# Patient Record
Sex: MALE | Race: BLACK OR AFRICAN AMERICAN | HISPANIC OR LATINO | Employment: FULL TIME | URBAN - METROPOLITAN AREA
[De-identification: names, ages, dates, MRNs, and addresses within clinical notes are randomized per-mention and may not be internally consistent; named-entity substitution may affect disease eponyms.]

---

## 2018-03-16 ENCOUNTER — OFFICE VISIT (OUTPATIENT)
Dept: FAMILY MEDICINE CLINIC | Facility: CLINIC | Age: 33
End: 2018-03-16
Payer: COMMERCIAL

## 2018-03-16 VITALS
HEART RATE: 86 BPM | TEMPERATURE: 96.5 F | DIASTOLIC BLOOD PRESSURE: 74 MMHG | OXYGEN SATURATION: 96 % | RESPIRATION RATE: 20 BRPM | WEIGHT: 256 LBS | BODY MASS INDEX: 37.92 KG/M2 | SYSTOLIC BLOOD PRESSURE: 126 MMHG | HEIGHT: 69 IN

## 2018-03-16 DIAGNOSIS — K64.8 OTHER HEMORRHOIDS: ICD-10-CM

## 2018-03-16 DIAGNOSIS — K59.09 OTHER CONSTIPATION: ICD-10-CM

## 2018-03-16 DIAGNOSIS — K21.00 GASTROESOPHAGEAL REFLUX DISEASE WITH ESOPHAGITIS: Primary | ICD-10-CM

## 2018-03-16 PROCEDURE — 99203 OFFICE O/P NEW LOW 30 MIN: CPT | Performed by: PHYSICIAN ASSISTANT

## 2018-03-16 RX ORDER — PANTOPRAZOLE SODIUM 20 MG/1
20 TABLET, DELAYED RELEASE ORAL DAILY
Qty: 90 TABLET | Refills: 1 | Status: SHIPPED | OUTPATIENT
Start: 2018-03-16 | End: 2021-03-26 | Stop reason: SDUPTHER

## 2018-03-16 NOTE — ASSESSMENT & PLAN NOTE
Discussed with patient that he should continue take Metamucil on a daily basis even if bowel movements are normal   If he starts to experience any diarrhea that is when I would continue to cut back on the Metamucil  Gave patient a list of foods that can improve constipation symptoms, or make them worse

## 2018-03-16 NOTE — ASSESSMENT & PLAN NOTE
With concerns of hemorrhoids and rectal pain with bowel movements will refer to colorectal surgery for further evaluation

## 2018-03-16 NOTE — PROGRESS NOTES
Assessment/Plan:    Gastroesophageal reflux disease with esophagitis  Believe patient's symptoms are due to acid reflux  Will start on pantoprazole to see if this will help with symptoms  Will have patient follow up in 1 month to see if the symptoms improved  If there is no improvement will order a swallow study  Other constipation  Discussed with patient that he should continue take Metamucil on a daily basis even if bowel movements are normal   If he starts to experience any diarrhea that is when I would continue to cut back on the Metamucil  Gave patient a list of foods that can improve constipation symptoms, or make them worse  Other hemorrhoids  With concerns of hemorrhoids and rectal pain with bowel movements will refer to colorectal surgery for further evaluation  Diagnoses and all orders for this visit:    Gastroesophageal reflux disease with esophagitis  -     pantoprazole (PROTONIX) 20 mg tablet; Take 1 tablet (20 mg total) by mouth daily  -     CBC and differential; Future  -     Comprehensive metabolic panel; Future  -     Lipid panel; Future  -     Urinalysis with reflex to microscopic; Future    Other constipation  -     Ambulatory referral to Colorectal Surgery; Future    Other hemorrhoids  -     Ambulatory referral to Colorectal Surgery; Future          Subjective:      Patient ID: Adri Julian is a 29 y o  male  78-year-old male presenting with concerns of food getting stuck when he swallows x 1 year  States that when he swallows solids it feels like it is getting stuck on the way down  At times this will causing the cough and he may bring food back up  Does describe a sharp pain in the epigastric area  Denies any burning sensation in his chest, or symptoms waking him up at night  Has noticed certain foods, such as spicy foods, it due make his symptoms worse  Patient has also been experiencing constipation for the last year    States that he does have bowel movements on a daily basis, however he has to strain with bowel movements  When he does heavy lifting he will get the urge to have a bowel movement, when he tries he will be unsuccessful  Does note that he will have blood on the surface of the stool 1 to 2 times a week  He will also have rectal pain with bowel movements  States that he drinks a lot of water and ice tea, he used to drink a lot of milk but has been cutting back  States for breakfast he typically will have a breakfast sandwich  For lunch and dinner he typically has rice, beans, and meat  Eats limited amount of fruits and vegetables during the day  Has been taking Metamucil, which has been helping with bowel movements  The following portions of the patient's history were reviewed and updated as appropriate:   He  has no past medical history on file  He   Patient Active Problem List    Diagnosis Date Noted    Gastroesophageal reflux disease with esophagitis 03/16/2018    Other constipation 03/16/2018    Other hemorrhoids 03/16/2018     His family history includes Asthma in his mother; Diabetes in his cousin and father  He  reports that he has never smoked  He has never used smokeless tobacco  He reports that he does not drink alcohol or use drugs  Current Outpatient Prescriptions   Medication Sig Dispense Refill    pantoprazole (PROTONIX) 20 mg tablet Take 1 tablet (20 mg total) by mouth daily 90 tablet 1     No current facility-administered medications for this visit  He has No Known Allergies       Review of Systems   Constitutional: Negative for activity change, appetite change, chills, fatigue and fever  HENT: Positive for trouble swallowing  Negative for sore throat  Respiratory: Negative for cough, chest tightness and shortness of breath  Cardiovascular: Negative for chest pain, palpitations and leg swelling  Gastrointestinal: Positive for abdominal pain, blood in stool, constipation and nausea   Negative for diarrhea and vomiting  Endocrine: Negative for polydipsia, polyphagia and polyuria  Genitourinary: Negative for dysuria  Musculoskeletal: Negative for back pain  Neurological: Negative for dizziness, syncope, weakness and headaches  Objective:      /74   Pulse 86   Temp (!) 96 5 °F (35 8 °C)   Resp 20   Ht 5' 8 5" (1 74 m)   Wt 116 kg (256 lb)   SpO2 96%   BMI 38 36 kg/m²          Physical Exam   Constitutional: He is oriented to person, place, and time  He appears well-developed and well-nourished  No distress  HENT:   Right Ear: Tympanic membrane, external ear and ear canal normal    Left Ear: Tympanic membrane, external ear and ear canal normal    Nose: Nose normal    Mouth/Throat: Oropharynx is clear and moist and mucous membranes are normal  No oropharyngeal exudate  Eyes: Conjunctivae and EOM are normal  Pupils are equal, round, and reactive to light  Neck: Normal range of motion  Neck supple  Cardiovascular: Normal rate, regular rhythm, normal heart sounds and normal pulses  Exam reveals no gallop and no friction rub  No murmur heard  Pulmonary/Chest: Effort normal and breath sounds normal  No respiratory distress  He has no wheezes  He has no rales  Abdominal: Soft  Bowel sounds are normal  He exhibits no distension and no mass  There is no tenderness  There is no rebound and no guarding  Musculoskeletal: Normal range of motion  He exhibits no edema  Lymphadenopathy:     He has no cervical adenopathy  Neurological: He is alert and oriented to person, place, and time  He has normal strength and normal reflexes  No cranial nerve deficit  Skin: Skin is warm and dry  No rash noted  He is not diaphoretic  Psychiatric: He has a normal mood and affect  His behavior is normal    Nursing note and vitals reviewed

## 2018-03-16 NOTE — ASSESSMENT & PLAN NOTE
Believe patient's symptoms are due to acid reflux  Will start on pantoprazole to see if this will help with symptoms  Will have patient follow up in 1 month to see if the symptoms improved  If there is no improvement will order a swallow study

## 2018-03-16 NOTE — PATIENT INSTRUCTIONS
Constipation   WHAT YOU NEED TO KNOW:   Constipation is when you have hard, dry bowel movements, or you go longer than usual between bowel movements  DISCHARGE INSTRUCTIONS:   Return to the emergency department if:   · You have blood in your bowel movements  · You have a fever and abdominal pain with the constipation  Contact your healthcare provider if:   · Your constipation gets worse  · You start to vomit  · You have questions or concerns about your condition or care  Medicines:   · Medicine or a fiber supplement  may help make your bowel movement softer  A laxative may help relax and loosen your intestines to help you have a bowel movement  You may also be given medicine to increase fluid in your intestines  The fluid may help move bowel movements through your intestines  · Take your medicine as directed  Contact your healthcare provider if you think your medicine is not helping or if you have side effects  Tell him of her if you are allergic to any medicine  Keep a list of the medicines, vitamins, and herbs you take  Include the amounts, and when and why you take them  Bring the list or the pill bottles to follow-up visits  Carry your medicine list with you in case of an emergency  Manage your constipation:   · Drink liquids as directed  You may need to drink extra liquids to help soften and move your bowels  Ask how much liquid to drink each day and which liquids are best for you  · Eat high-fiber foods  This may help decrease constipation by adding bulk to your bowel movements  High-fiber foods include fruit, vegetables, whole-grain breads and cereals, and beans  Your healthcare provider or dietitian can help you create a high-fiber meal plan  · Exercise regularly  Regular physical activity can help stimulate your intestines  Ask which exercises are best for you  · Schedule a time each day to have a bowel movement    This may help train your body to have regular bowel movements  Bend forward while you are on the toilet to help move the bowel movement out  Sit on the toilet for at least 10 minutes, even if you do not have a bowel movement  Follow up with your healthcare provider as directed:  Write down your questions so you remember to ask them during your visits  © 2017 [address] [employer] Information is for End User's use only and may not be sold, redistributed or otherwise used for commercial purposes  All illustrations and images included in CareNotes® are the copyrighted property of [employer]  or [employer]ine Dixon  The above information is an  only  It is not intended as medical advice for individual conditions or treatments  Talk to your doctor, nurse or pharmacist before following any medical regimen to see if it is safe and effective for you

## 2018-03-26 ENCOUNTER — TRANSCRIBE ORDERS (OUTPATIENT)
Dept: LAB | Facility: CLINIC | Age: 33
End: 2018-03-26

## 2018-03-26 ENCOUNTER — APPOINTMENT (OUTPATIENT)
Dept: LAB | Facility: CLINIC | Age: 33
End: 2018-03-26
Payer: COMMERCIAL

## 2018-03-26 DIAGNOSIS — K21.00 GASTROESOPHAGEAL REFLUX DISEASE WITH ESOPHAGITIS: ICD-10-CM

## 2018-03-26 LAB
ALBUMIN SERPL BCP-MCNC: 3.9 G/DL (ref 3.5–5)
ALP SERPL-CCNC: 76 U/L (ref 46–116)
ALT SERPL W P-5'-P-CCNC: 28 U/L (ref 12–78)
ANION GAP SERPL CALCULATED.3IONS-SCNC: 5 MMOL/L (ref 4–13)
AST SERPL W P-5'-P-CCNC: 21 U/L (ref 5–45)
BASOPHILS # BLD AUTO: 0.05 THOUSANDS/ΜL (ref 0–0.1)
BASOPHILS NFR BLD AUTO: 1 % (ref 0–1)
BILIRUB SERPL-MCNC: 1.34 MG/DL (ref 0.2–1)
BILIRUB UR QL STRIP: NEGATIVE
BUN SERPL-MCNC: 14 MG/DL (ref 5–25)
CALCIUM SERPL-MCNC: 8.9 MG/DL (ref 8.3–10.1)
CHLORIDE SERPL-SCNC: 105 MMOL/L (ref 100–108)
CHOLEST SERPL-MCNC: 200 MG/DL (ref 50–200)
CLARITY UR: ABNORMAL
CO2 SERPL-SCNC: 28 MMOL/L (ref 21–32)
COLOR UR: ABNORMAL
CREAT SERPL-MCNC: 0.91 MG/DL (ref 0.6–1.3)
EOSINOPHIL # BLD AUTO: 0.16 THOUSAND/ΜL (ref 0–0.61)
EOSINOPHIL NFR BLD AUTO: 2 % (ref 0–6)
ERYTHROCYTE [DISTWIDTH] IN BLOOD BY AUTOMATED COUNT: 12.9 % (ref 11.6–15.1)
GFR SERPL CREATININE-BSD FRML MDRD: 111 ML/MIN/1.73SQ M
GLUCOSE P FAST SERPL-MCNC: 98 MG/DL (ref 65–99)
GLUCOSE UR STRIP-MCNC: NEGATIVE MG/DL
HCT VFR BLD AUTO: 43 % (ref 36.5–49.3)
HDLC SERPL-MCNC: 42 MG/DL (ref 40–60)
HGB BLD-MCNC: 15 G/DL (ref 12–17)
HGB UR QL STRIP.AUTO: NEGATIVE
KETONES UR STRIP-MCNC: NEGATIVE MG/DL
LDLC SERPL CALC-MCNC: 111 MG/DL (ref 0–100)
LEUKOCYTE ESTERASE UR QL STRIP: NEGATIVE
LYMPHOCYTES # BLD AUTO: 2.16 THOUSANDS/ΜL (ref 0.6–4.47)
LYMPHOCYTES NFR BLD AUTO: 32 % (ref 14–44)
MCH RBC QN AUTO: 29.1 PG (ref 26.8–34.3)
MCHC RBC AUTO-ENTMCNC: 34.9 G/DL (ref 31.4–37.4)
MCV RBC AUTO: 84 FL (ref 82–98)
MONOCYTES # BLD AUTO: 0.49 THOUSAND/ΜL (ref 0.17–1.22)
MONOCYTES NFR BLD AUTO: 7 % (ref 4–12)
NEUTROPHILS # BLD AUTO: 3.93 THOUSANDS/ΜL (ref 1.85–7.62)
NEUTS SEG NFR BLD AUTO: 58 % (ref 43–75)
NITRITE UR QL STRIP: NEGATIVE
NRBC BLD AUTO-RTO: 0 /100 WBCS
PH UR STRIP.AUTO: 6 [PH] (ref 4.5–8)
PLATELET # BLD AUTO: 231 THOUSANDS/UL (ref 149–390)
PMV BLD AUTO: 9.4 FL (ref 8.9–12.7)
POTASSIUM SERPL-SCNC: 4 MMOL/L (ref 3.5–5.3)
PROT SERPL-MCNC: 7.8 G/DL (ref 6.4–8.2)
PROT UR STRIP-MCNC: NEGATIVE MG/DL
RBC # BLD AUTO: 5.15 MILLION/UL (ref 3.88–5.62)
SODIUM SERPL-SCNC: 138 MMOL/L (ref 136–145)
SP GR UR STRIP.AUTO: 1.03 (ref 1–1.03)
TRIGL SERPL-MCNC: 234 MG/DL
UROBILINOGEN UR QL STRIP.AUTO: 0.2 E.U./DL
WBC # BLD AUTO: 6.88 THOUSAND/UL (ref 4.31–10.16)

## 2018-03-26 PROCEDURE — 80061 LIPID PANEL: CPT

## 2018-03-26 PROCEDURE — 81003 URINALYSIS AUTO W/O SCOPE: CPT

## 2018-03-26 PROCEDURE — 80053 COMPREHEN METABOLIC PANEL: CPT

## 2018-03-26 PROCEDURE — 85025 COMPLETE CBC W/AUTO DIFF WBC: CPT

## 2018-03-26 PROCEDURE — 36415 COLL VENOUS BLD VENIPUNCTURE: CPT

## 2018-04-20 ENCOUNTER — OFFICE VISIT (OUTPATIENT)
Dept: FAMILY MEDICINE CLINIC | Facility: CLINIC | Age: 33
End: 2018-04-20
Payer: COMMERCIAL

## 2018-04-20 VITALS
TEMPERATURE: 97.9 F | SYSTOLIC BLOOD PRESSURE: 120 MMHG | RESPIRATION RATE: 18 BRPM | DIASTOLIC BLOOD PRESSURE: 72 MMHG | HEART RATE: 118 BPM | BODY MASS INDEX: 38.28 KG/M2 | HEIGHT: 68 IN | OXYGEN SATURATION: 97 % | WEIGHT: 252.56 LBS

## 2018-04-20 DIAGNOSIS — M25.561 LATERAL KNEE PAIN, RIGHT: Primary | ICD-10-CM

## 2018-04-20 PROCEDURE — 1036F TOBACCO NON-USER: CPT | Performed by: PHYSICIAN ASSISTANT

## 2018-04-20 PROCEDURE — 3008F BODY MASS INDEX DOCD: CPT | Performed by: PHYSICIAN ASSISTANT

## 2018-04-20 PROCEDURE — 99213 OFFICE O/P EST LOW 20 MIN: CPT | Performed by: PHYSICIAN ASSISTANT

## 2018-04-20 RX ORDER — BACLOFEN 10 MG/1
10 TABLET ORAL 3 TIMES DAILY PRN
Qty: 30 TABLET | Refills: 0 | Status: SHIPPED | OUTPATIENT
Start: 2018-04-20

## 2018-04-20 RX ORDER — NAPROXEN 500 MG/1
500 TABLET ORAL 2 TIMES DAILY WITH MEALS
Qty: 60 TABLET | Refills: 0 | Status: SHIPPED | OUTPATIENT
Start: 2018-04-20

## 2018-04-20 NOTE — PATIENT INSTRUCTIONS
Iliotibial Band Syndrome   AMBULATORY CARE:   Iliotibial band syndrome (ITBS)  also known as runner's knee, happens when your iliotibial band becomes injured and causes pain  The iliotibial band is a long band of tissue that extends from the outside of your pelvis (hip bone) to the outside of your tibia (shin bone)  It helps keeps the knee in the correct position when you stand or move  ITBS occurs most often in long distance runners and cyclists  Common symptoms include the following:   · Pain or swelling in your hip, knee, thigh, or buttock    · Pain when you touch your hip, outer thigh, or outer part of your knee    · Pain that gets worse during running or repetitive exercises and gets better when you rest  Contact your healthcare provider if:   · You have a fever or chills  · You have redness, warmth, or severe swelling  · Your pain does not improve or gets worse after treatments  · You have questions or concerns about your condition or care  Treatment for ITBS  may include rest and physical therapy  Surgery may be needed if other treatments do not work  Medicines:  may include any  of the following:  · NSAIDs , such as ibuprofen, help decrease swelling, pain, and fever  This medicine is available with or without a doctor's order  NSAIDs can cause stomach bleeding or kidney problems in certain people  If you take blood thinner medicine, always ask your healthcare provider if NSAIDs are safe for you  Always read the medicine label and follow directions  · Medicine to decrease pain and swelling  can be applied to your skin over sore areas as directed  · Prescription pain medicine  may be given  Ask your healthcare provider how to take this medicine safely  · Steroid injections  may be given to decrease swelling  · Take your medicine as directed  Contact your healthcare provider if you think your medicine is not helping or if you have side effects   Tell him or her if you are allergic to any medicine  Keep a list of the medicines, vitamins, and herbs you take  Include the amounts, and when and why you take them  Bring the list or the pill bottles to follow-up visits  Carry your medicine list with you in case of an emergency  Manage your symptoms:   · Rest   Do not do exercises that cause pain  You may need to rest for several weeks  Ask your healthcare provider when you can resume your normal exercises  · Apply ice  on your hip, knee or thigh for 15 to 20 minutes every hour or as directed  Use an ice pack, or put crushed ice in a plastic bag  Cover it with a towel  Ice helps prevent tissue damage and decreases swelling and pain  · Apply heat  on the area in pain for 20 to 30 minutes before your stretch or exercise  Heat helps decrease pain and makes it easier to stretch your muscles  · Massage painful areas as directed  Use a foam roller to gently massage your painful areas  Place the foam roller on a flat surface  Lie on your side with the foam roller against your painful leg  Move so that it rolls up and down from your hip to above your knee  Do not lie with it against the outside of your knee cap  · Stretch as directed  Stand and wrap your painful leg behind and to the side of your other leg  Reach your arm to the outside of the ankle of your painful leg  Hold for 15 seconds Return to the starting position  Do this 10 times  Repeat this exercise 3 to 5 times per day  Ask your healthcare provider or physical therapist for other ways to stretch  · Go to physical therapy as directed  Your physical therapist will teach you how to do exercises that will strengthen your hip muscles and improve your flexibility  Prevent iliotibial band syndrome:   · Slowly increase the time and distance that you run or bike  Start with short runs or cycle rides  Ask your healthcare provider how and when to increase your distance and time that you exercise       · Do not run down hills or stairs  When your healthcare provider says it is okay to exercise, run on flat surfaces only  [place] and stairs may cause pain and make your condition worse  · Warm up and stretch  before vigorous exercise or sports  · Change the seat height or foot position of your bicycle  Ask your physical therapist what the correct height and foot position of your bicycle should be  Make these changes to decrease the stress on your iliotibial band  · Use correct body position when you exercise  Ask your healthcare provider or physical therapist how to move your legs and feet when running or cycling  This may decrease the stress on your iliotibial band  · Wear supportive shoes or orthotics  Orthotics are cushions that are placed inside of your shoes or sneakers  You may need orthotics in your running shoes if the bottom of your feet do not curve or arch enough off of the floor  Ask your healthcare provider if you need orthotics  Follow up with your healthcare provider as directed:  Write down your questions so you remember to ask them during your visits  © 2017 [facility] [place][note 2] Information is for End User's use only and may not be sold, redistributed or otherwise used for commercial purposes  All illustrations and images included in CareNotes® are the copyrighted property of [employer]  or Carmine Dixon  The above information is an  only  It is not intended as medical advice for individual conditions or treatments  Talk to your doctor, nurse or pharmacist before following any medical regimen to see if it is safe and effective for you

## 2018-04-20 NOTE — PROGRESS NOTES
Assessment/Plan:    Gastroesophageal reflux disease with esophagitis  Stable  Continue with pantoprazole 20 mg daily  Lateral knee pain, right  X-ray of right knee was normal   Will order new x-ray to verify no bone abnormality  Believe at this time pain is due to iliotibial band syndrome  Will prescribe naproxen on muscle relaxers to help with pain  Would recommend RICE  Gave patient instructions on exercises that may help with pain  Also gave referral to Ortho  Diagnoses and all orders for this visit:    Lateral knee pain, right  -     XR knee 3 vw right non injury; Future  -     Cancel: Ambulatory referral to Orthopedic Surgery; Future  -     Ambulatory referral to Orthopedic Surgery; Future  -     naproxen (NAPROSYN) 500 mg tablet; Take 1 tablet (500 mg total) by mouth 2 (two) times a day with meals  -     baclofen 10 mg tablet; Take 1 tablet (10 mg total) by mouth 3 (three) times a day as needed for muscle spasms          Subjective:      Patient ID: Alan Chery is a 29 y o  male  41-year-old male presenting for follow-up of acid reflux  Patient states that the medication has greatly improved his symptoms  Has no longer having epigastric pain, or any dysphagia  No further concerns  Patient does have concerns of right knee pain  States that the pain is been going on for several weeks ago  Pain will come and go  Three weeks ago patient was at work, and when he stood up from his chair and turned, it felt like his knee dislocated and gave out on him  Since then he has been having pain on the lateral side of his right knee  States this is the similar pain to what he has had in the past   When he bends his knee it feels like there is bone on bone  Pain is made worse with more he walks  Denies any clicks, swelling, erythema, numbness tingling in right lower extremity  Has not been taking anything over-the-counter              The following portions of the patient's history were reviewed and updated as appropriate:   He  has no past medical history on file  He   Patient Active Problem List    Diagnosis Date Noted    Lateral knee pain, right 04/20/2018    Gastroesophageal reflux disease with esophagitis 03/16/2018    Other constipation 03/16/2018    Other hemorrhoids 03/16/2018     He  has a past surgical history that includes No past surgeries  His family history includes Asthma in his mother; Diabetes in his cousin and father  He  reports that he has never smoked  He has never used smokeless tobacco  He reports that he does not drink alcohol or use drugs  Current Outpatient Prescriptions   Medication Sig Dispense Refill    baclofen 10 mg tablet Take 1 tablet (10 mg total) by mouth 3 (three) times a day as needed for muscle spasms 30 tablet 0    naproxen (NAPROSYN) 500 mg tablet Take 1 tablet (500 mg total) by mouth 2 (two) times a day with meals 60 tablet 0    pantoprazole (PROTONIX) 20 mg tablet Take 1 tablet (20 mg total) by mouth daily 90 tablet 1     No current facility-administered medications for this visit  He has No Known Allergies       Review of Systems   Constitutional: Negative for appetite change, chills, fatigue and fever  HENT: Negative for sore throat and trouble swallowing  Respiratory: Negative for shortness of breath  Cardiovascular: Negative for chest pain  Gastrointestinal: Negative for abdominal distention, constipation, diarrhea, nausea and vomiting  Genitourinary: Negative for dysuria  Musculoskeletal:        See HPI   Neurological: Negative for weakness and numbness  Psychiatric/Behavioral: Negative for dysphoric mood and sleep disturbance  The patient is not nervous/anxious            Objective:      /72 (BP Location: Right arm, Patient Position: Sitting, Cuff Size: Large)   Pulse (!) 118   Temp 97 9 °F (36 6 °C) (Tympanic)   Resp 18   Ht 5' 8" (1 727 m)   Wt 115 kg (252 lb 9 oz)   SpO2 97%   BMI 38 40 kg/m² Physical Exam   Constitutional: He appears well-developed and well-nourished  No distress  Cardiovascular: Normal rate, regular rhythm and normal heart sounds  Exam reveals no gallop and no friction rub  No murmur heard  Pulmonary/Chest: Effort normal and breath sounds normal  No respiratory distress  He has no wheezes  He has no rales  Abdominal: Soft  Bowel sounds are normal  He exhibits no distension  There is no tenderness  There is no rebound and no guarding  Musculoskeletal:        Right knee: He exhibits normal range of motion, no swelling, no effusion and no bony tenderness  Tenderness found  Legs:  Neurological: No cranial nerve deficit  Skin: He is not diaphoretic  Nursing note and vitals reviewed

## 2018-04-22 NOTE — ASSESSMENT & PLAN NOTE
X-ray of right knee was normal   Will order new x-ray to verify no bone abnormality  Believe at this time pain is due to iliotibial band syndrome  Will prescribe naproxen on muscle relaxers to help with pain  Would recommend ZENON  Gave patient instructions on exercises that may help with pain  Also gave referral to Ortho

## 2021-03-26 ENCOUNTER — APPOINTMENT (OUTPATIENT)
Dept: LAB | Facility: CLINIC | Age: 36
End: 2021-03-26
Payer: COMMERCIAL

## 2021-03-26 ENCOUNTER — OFFICE VISIT (OUTPATIENT)
Dept: FAMILY MEDICINE CLINIC | Facility: CLINIC | Age: 36
End: 2021-03-26

## 2021-03-26 VITALS
RESPIRATION RATE: 18 BRPM | DIASTOLIC BLOOD PRESSURE: 82 MMHG | OXYGEN SATURATION: 94 % | HEART RATE: 85 BPM | HEIGHT: 69 IN | WEIGHT: 242.8 LBS | SYSTOLIC BLOOD PRESSURE: 116 MMHG | TEMPERATURE: 97.4 F | BODY MASS INDEX: 35.96 KG/M2

## 2021-03-26 DIAGNOSIS — K21.00 GASTROESOPHAGEAL REFLUX DISEASE WITH ESOPHAGITIS: ICD-10-CM

## 2021-03-26 DIAGNOSIS — B00.9 HERPES SIMPLEX: ICD-10-CM

## 2021-03-26 DIAGNOSIS — Z00.01 ENCOUNTER FOR GENERAL ADULT MEDICAL EXAMINATION WITH ABNORMAL FINDINGS: Primary | ICD-10-CM

## 2021-03-26 DIAGNOSIS — Z87.898: ICD-10-CM

## 2021-03-26 LAB
ANION GAP SERPL CALCULATED.3IONS-SCNC: 6 MMOL/L (ref 4–13)
BASOPHILS # BLD AUTO: 0.08 THOUSANDS/ΜL (ref 0–0.1)
BASOPHILS NFR BLD AUTO: 1 % (ref 0–1)
BUN SERPL-MCNC: 13 MG/DL (ref 5–25)
CALCIUM SERPL-MCNC: 9.6 MG/DL (ref 8.3–10.1)
CHLORIDE SERPL-SCNC: 105 MMOL/L (ref 100–108)
CHOLEST SERPL-MCNC: 237 MG/DL (ref 50–200)
CO2 SERPL-SCNC: 27 MMOL/L (ref 21–32)
CREAT SERPL-MCNC: 0.81 MG/DL (ref 0.6–1.3)
EOSINOPHIL # BLD AUTO: 0.12 THOUSAND/ΜL (ref 0–0.61)
EOSINOPHIL NFR BLD AUTO: 2 % (ref 0–6)
ERYTHROCYTE [DISTWIDTH] IN BLOOD BY AUTOMATED COUNT: 13.4 % (ref 11.6–15.1)
GFR SERPL CREATININE-BSD FRML MDRD: 133 ML/MIN/1.73SQ M
GLUCOSE P FAST SERPL-MCNC: 89 MG/DL (ref 65–99)
HCT VFR BLD AUTO: 46.7 % (ref 36.5–49.3)
HDLC SERPL-MCNC: 48 MG/DL
HGB BLD-MCNC: 15.3 G/DL (ref 12–17)
IMM GRANULOCYTES # BLD AUTO: 0.03 THOUSAND/UL (ref 0–0.2)
IMM GRANULOCYTES NFR BLD AUTO: 1 % (ref 0–2)
LDLC SERPL CALC-MCNC: 145 MG/DL (ref 0–100)
LYMPHOCYTES # BLD AUTO: 1.58 THOUSANDS/ΜL (ref 0.6–4.47)
LYMPHOCYTES NFR BLD AUTO: 25 % (ref 14–44)
MCH RBC QN AUTO: 28.2 PG (ref 26.8–34.3)
MCHC RBC AUTO-ENTMCNC: 32.8 G/DL (ref 31.4–37.4)
MCV RBC AUTO: 86 FL (ref 82–98)
MONOCYTES # BLD AUTO: 0.48 THOUSAND/ΜL (ref 0.17–1.22)
MONOCYTES NFR BLD AUTO: 8 % (ref 4–12)
NEUTROPHILS # BLD AUTO: 4.03 THOUSANDS/ΜL (ref 1.85–7.62)
NEUTS SEG NFR BLD AUTO: 63 % (ref 43–75)
NONHDLC SERPL-MCNC: 189 MG/DL
NRBC BLD AUTO-RTO: 0 /100 WBCS
PLATELET # BLD AUTO: 228 THOUSANDS/UL (ref 149–390)
PMV BLD AUTO: 8.9 FL (ref 8.9–12.7)
POTASSIUM SERPL-SCNC: 4 MMOL/L (ref 3.5–5.3)
RBC # BLD AUTO: 5.43 MILLION/UL (ref 3.88–5.62)
SODIUM SERPL-SCNC: 138 MMOL/L (ref 136–145)
TRIGL SERPL-MCNC: 218 MG/DL
TSH SERPL DL<=0.05 MIU/L-ACNC: 0.85 UIU/ML (ref 0.36–3.74)
WBC # BLD AUTO: 6.32 THOUSAND/UL (ref 4.31–10.16)

## 2021-03-26 PROCEDURE — 93000 ELECTROCARDIOGRAM COMPLETE: CPT | Performed by: NURSE PRACTITIONER

## 2021-03-26 PROCEDURE — 80061 LIPID PANEL: CPT

## 2021-03-26 PROCEDURE — 86695 HERPES SIMPLEX TYPE 1 TEST: CPT

## 2021-03-26 PROCEDURE — 99215 OFFICE O/P EST HI 40 MIN: CPT | Performed by: NURSE PRACTITIONER

## 2021-03-26 PROCEDURE — 84443 ASSAY THYROID STIM HORMONE: CPT

## 2021-03-26 PROCEDURE — 86696 HERPES SIMPLEX TYPE 2 TEST: CPT

## 2021-03-26 PROCEDURE — 3008F BODY MASS INDEX DOCD: CPT | Performed by: NURSE PRACTITIONER

## 2021-03-26 PROCEDURE — 1036F TOBACCO NON-USER: CPT | Performed by: NURSE PRACTITIONER

## 2021-03-26 PROCEDURE — 36415 COLL VENOUS BLD VENIPUNCTURE: CPT

## 2021-03-26 PROCEDURE — 3725F SCREEN DEPRESSION PERFORMED: CPT | Performed by: NURSE PRACTITIONER

## 2021-03-26 PROCEDURE — 80048 BASIC METABOLIC PNL TOTAL CA: CPT

## 2021-03-26 PROCEDURE — 85025 COMPLETE CBC W/AUTO DIFF WBC: CPT

## 2021-03-26 RX ORDER — PANTOPRAZOLE SODIUM 20 MG/1
20 TABLET, DELAYED RELEASE ORAL DAILY
Qty: 90 TABLET | Refills: 1 | Status: SHIPPED | OUTPATIENT
Start: 2021-03-26

## 2021-03-26 RX ORDER — ACYCLOVIR 400 MG/1
400 TABLET ORAL
Qty: 50 TABLET | Refills: 0 | Status: SHIPPED | OUTPATIENT
Start: 2021-03-26 | End: 2021-04-30

## 2021-03-26 NOTE — ASSESSMENT & PLAN NOTE
Bilateral clusters of tender, erythematous papules on arms/trunk  Denies genital or oral lesions    Inspection consistent with herpes simplex   -serology ordered  -initiate antiviral  -f/u in 4 weeks

## 2021-03-26 NOTE — ASSESSMENT & PLAN NOTE
GERD symptoms continue, may explain abdominal pain  -start PPI  -Review diet, handout provided  -f/u 4 weeks

## 2021-03-26 NOTE — ASSESSMENT & PLAN NOTE
BMI above normal  Counseling and plan as documented below   -Pt acknowledges understanding and agrees to lifestyle changes   -Additional handouts on diet/exercise provided   -Will monitor progress at next scheduled follow-up   -Discussed correlation w/GERD

## 2021-03-26 NOTE — PROGRESS NOTES
Assessment/Plan:    Problem List Items Addressed This Visit        Digestive    Gastroesophageal reflux disease with esophagitis     GERD symptoms continue, may explain abdominal pain  -start PPI  -Review diet, handout provided  -f/u 4 weeks  Relevant Medications    pantoprazole (PROTONIX) 20 mg tablet       Other    Herpes simplex     Bilateral clusters of tender, erythematous papules on arms/trunk  Denies genital or oral lesions  Inspection consistent with herpes simplex   -serology ordered  -initiate antiviral  -f/u in 4 weeks         Relevant Medications    acyclovir (ZOVIRAX) 400 MG tablet    Other Relevant Orders    HSV 1 and 2-Spec Ab, IgG w/Reflex    Encounter for general adult medical examination with abnormal findings - Primary    Hx of radiating chest pain     EKG in office is normal w/no evidence of past heart damage  His risk of CAD is is low with a score of 0 based on the [place] Heart Score - no additional testing indicated  Will approach this by addressing uncontrolled severe GERD  Discussed details of chest pain and GERD  Discussed diet  Rx for PPI  Strict instruction to return to ED with chest pain  Relevant Orders    POCT ECG      Other Visit Diagnoses     BMI 35 0-35 9,adult        Relevant Orders    CBC and differential    Basic metabolic panel    Lipid panel    TSH, 3rd generation with Free T4 reflex            No follow-ups on file  Subjective:     HPI: Fady Mejias is a 29 y o  male who  has no past medical history on file  who presented to the office today for rash on his arms and trunk for at least the last 2 years  He states he did see a dermatologist but can not remember who, exactly how long ago, or what they suggested this rash could be  It consists of multiple patches bilaterally to arms and trunk, they are papular, erythematous, and tender if pressed  His wife present for the exam denies similar rash    Patient denies this rash on genitals or any other area of the body  Patient also states he had one, "maybe two", episodes of sharp chest pain that radiated down his left arm accompanied by dizziness  He did not seek medical attention  He does explain that he has severe acid reflux symptoms  He has no other complaints in all the patient's questions were answered  The following portions of the patient's history were reviewed and updated as appropriate: allergies, current medications, past family history, past medical history, past social history, past surgical history, and problem list     Current Outpatient Medications on File Prior to Visit   Medication Sig Dispense Refill    baclofen 10 mg tablet Take 1 tablet (10 mg total) by mouth 3 (three) times a day as needed for muscle spasms (Patient not taking: Reported on 3/26/2021) 30 tablet 0    naproxen (NAPROSYN) 500 mg tablet Take 1 tablet (500 mg total) by mouth 2 (two) times a day with meals (Patient not taking: Reported on 3/26/2021) 60 tablet 0    [DISCONTINUED] pantoprazole (PROTONIX) 20 mg tablet Take 1 tablet (20 mg total) by mouth daily (Patient not taking: Reported on 3/26/2021) 90 tablet 1     No current facility-administered medications on file prior to visit  Review of Systems   Constitutional: Negative for activity change, chills, fatigue, fever and unexpected weight change  HENT: Negative for congestion, ear pain, hearing loss, rhinorrhea, sinus pressure and sore throat  Eyes: Negative for pain and visual disturbance  Respiratory: Negative for cough, shortness of breath and wheezing  Cardiovascular: Positive for chest pain  Negative for palpitations and leg swelling  Gastrointestinal: Positive for abdominal pain  Negative for nausea and vomiting  Genitourinary: Negative for dysuria and hematuria  Musculoskeletal: Negative for arthralgias, back pain, gait problem, joint swelling and myalgias  Skin: Negative for color change and rash     Neurological: Positive for dizziness  Negative for tremors, seizures, syncope, weakness, light-headedness and headaches  Psychiatric/Behavioral: Negative for agitation, behavioral problems, confusion, dysphoric mood, self-injury, sleep disturbance and suicidal ideas  The patient is not nervous/anxious  All other systems reviewed and are negative  BMI Counseling: Body mass index is 35 86 kg/m²  The BMI is above normal  Nutrition recommendations include decreasing portion sizes, encouraging healthy choices of fruits and vegetables, decreasing fast food intake, consuming healthier snacks, limiting drinks that contain sugar, moderation in carbohydrate intake, increasing intake of lean protein, reducing intake of saturated and trans fat and reducing intake of cholesterol  Exercise recommendations include moderate physical activity 150 minutes/week, exercising 3-5 times per week and obtaining a gym membership  Objective:    /82 (BP Location: Left arm, Patient Position: Sitting, Cuff Size: Standard)   Pulse 85   Temp (!) 97 4 °F (36 3 °C) (Temporal)   Resp 18   Ht 5' 9" (1 753 m)   Wt 110 kg (242 lb 12 8 oz)   SpO2 94%   BMI 35 86 kg/m²     Physical Exam  Constitutional:       Appearance: Normal appearance  He is normal weight  HENT:      Head: Normocephalic  Right Ear: Tympanic membrane, ear canal and external ear normal  There is no impacted cerumen  Left Ear: Tympanic membrane, ear canal and external ear normal  There is no impacted cerumen  Nose: Nose normal       Mouth/Throat:      Mouth: Mucous membranes are moist    Eyes:      Extraocular Movements: Extraocular movements intact  Pupils: Pupils are equal, round, and reactive to light  Neck:      Musculoskeletal: Normal range of motion  Cardiovascular:      Rate and Rhythm: Normal rate and regular rhythm  Pulses: Normal pulses  Heart sounds: Normal heart sounds        Comments: POCT EKG normal  Pulmonary:      Effort: Pulmonary effort is normal       Breath sounds: Normal breath sounds  Abdominal:      General: Bowel sounds are normal       Palpations: Abdomen is soft  Musculoskeletal: Normal range of motion  General: No tenderness or signs of injury  Right lower leg: No edema  Left lower leg: No edema  Skin:     General: Skin is warm and dry  Capillary Refill: Capillary refill takes less than 2 seconds  Findings: Rash present  No bruising or lesion  Rash is crusting and papular  Comments: Bilateral clusters of tender, erythematous papules on arms/trunk  Denies genital or oral lesions  Inspection consistent with herpes simplex  Neurological:      General: No focal deficit present  Mental Status: He is alert and oriented to person, place, and time  Psychiatric:         Mood and Affect: Mood normal          Behavior: Behavior normal          Thought Content: Thought content normal          VERONICA Jett  03/26/21  2:23 PM    There are no Patient Instructions on file for this visit

## 2021-03-26 NOTE — ASSESSMENT & PLAN NOTE
EKG in office is normal w/no evidence of past heart damage  His risk of CAD is is low with a score of 0 based on the [place] Heart Score - no additional testing indicated  Will approach this by addressing uncontrolled severe GERD  Discussed details of chest pain and GERD  Discussed diet  Rx for PPI  Strict instruction to return to ED with chest pain

## 2021-03-27 LAB
HSV1 IGG SER IA-ACNC: 40.6 INDEX (ref 0–0.9)
HSV2 IGG SER IA-ACNC: <0.91 INDEX (ref 0–0.9)

## 2021-04-30 ENCOUNTER — OFFICE VISIT (OUTPATIENT)
Dept: FAMILY MEDICINE CLINIC | Facility: CLINIC | Age: 36
End: 2021-04-30

## 2021-04-30 VITALS
DIASTOLIC BLOOD PRESSURE: 74 MMHG | OXYGEN SATURATION: 96 % | RESPIRATION RATE: 18 BRPM | HEART RATE: 85 BPM | BODY MASS INDEX: 37.03 KG/M2 | HEIGHT: 69 IN | SYSTOLIC BLOOD PRESSURE: 122 MMHG | TEMPERATURE: 98.2 F | WEIGHT: 250 LBS

## 2021-04-30 DIAGNOSIS — B00.9 HERPES SIMPLEX: Primary | ICD-10-CM

## 2021-04-30 DIAGNOSIS — B00.9 RECURRENT HERPES SIMPLEX: ICD-10-CM

## 2021-04-30 PROCEDURE — 3008F BODY MASS INDEX DOCD: CPT | Performed by: NURSE PRACTITIONER

## 2021-04-30 PROCEDURE — 99213 OFFICE O/P EST LOW 20 MIN: CPT | Performed by: NURSE PRACTITIONER

## 2021-04-30 RX ORDER — VALACYCLOVIR HYDROCHLORIDE 1 G/1
1000 TABLET, FILM COATED ORAL 2 TIMES DAILY
Qty: 60 TABLET | Refills: 0 | Status: SHIPPED | OUTPATIENT
Start: 2021-04-30 | End: 2021-04-30

## 2021-04-30 RX ORDER — VALACYCLOVIR HYDROCHLORIDE 1 G/1
1000 TABLET, FILM COATED ORAL 2 TIMES DAILY
Qty: 60 TABLET | Refills: 1 | Status: SHIPPED | OUTPATIENT
Start: 2021-04-30 | End: 2021-05-30

## 2021-04-30 NOTE — ASSESSMENT & PLAN NOTE
No improvement in bilateral clusters of tender, erythematous papules on arms/trunk    Denies genital or oral lesions   -serology revealed IgG for Herpes 1, pt does endorse these lesions for >2 years, never treated  -failed acyclovir  -start Valtrex for 30 days remission and suppression, add'l refill if needed  -initiate Derm referral now in case no resolution

## 2021-04-30 NOTE — PROGRESS NOTES
Assessment/Plan:    Problem List Items Addressed This Visit        Other    Herpes simplex - Primary    Relevant Medications    valACYclovir (VALTREX) 1,000 mg tablet    Other Relevant Orders    Ambulatory referral to Dermatology    Recurrent herpes simplex     No improvement in bilateral clusters of tender, erythematous papules on arms/trunk  Denies genital or oral lesions   -serology revealed IgG for Herpes 1, pt does endorse these lesions for >2 years, never treated  -failed acyclovir  -start Valtrex for 30 days remission and suppression, add'l refill if needed  -initiate Derm referral now in case no resolution         Relevant Medications    valACYclovir (VALTREX) 1,000 mg tablet    Other Relevant Orders    Ambulatory referral to Dermatology            No follow-ups on file  Subjective:     HPI: Adri Julian is a 29 y o  male who  has no past medical history on file  who presented to the office today for a follow up on his herpes simplex I rash  He presents with bilateral clusters of erythematous papules on arms trunks  Serum HSV 1 IgG positive started on Acyclovir for 10 days  Patient states there has been little improvement in his rash  He was compliant with antiviral for all 10 days          The following portions of the patient's history were reviewed and updated as appropriate: allergies, current medications, past family history, past medical history, past social history, past surgical history, and problem list     Current Outpatient Medications on File Prior to Visit   Medication Sig Dispense Refill    pantoprazole (PROTONIX) 20 mg tablet Take 1 tablet (20 mg total) by mouth daily 90 tablet 1    baclofen 10 mg tablet Take 1 tablet (10 mg total) by mouth 3 (three) times a day as needed for muscle spasms (Patient not taking: Reported on 3/26/2021) 30 tablet 0    naproxen (NAPROSYN) 500 mg tablet Take 1 tablet (500 mg total) by mouth 2 (two) times a day with meals (Patient not taking: Reported on 3/26/2021) 60 tablet 0    [DISCONTINUED] acyclovir (ZOVIRAX) 400 MG tablet Take 1 tablet (400 mg total) by mouth every 4 (four) hours while awake for 10 days 50 tablet 0     No current facility-administered medications on file prior to visit  Review of Systems   Constitutional: Negative for activity change, chills, fatigue, fever and unexpected weight change  HENT: Negative for congestion, ear pain, hearing loss, rhinorrhea, sinus pressure and sore throat  Eyes: Negative for pain and visual disturbance  Respiratory: Negative for cough, shortness of breath and wheezing  Cardiovascular: Positive for chest pain  Negative for palpitations and leg swelling  Gastrointestinal: Positive for abdominal pain  Negative for nausea and vomiting  Genitourinary: Negative for dysuria and hematuria  Musculoskeletal: Negative for arthralgias, back pain, gait problem, joint swelling and myalgias  Skin: Negative for color change and rash  Neurological: Positive for dizziness  Negative for tremors, seizures, syncope, weakness, light-headedness and headaches  Psychiatric/Behavioral: Negative for agitation, behavioral problems, confusion, dysphoric mood, self-injury, sleep disturbance and suicidal ideas  The patient is not nervous/anxious  All other systems reviewed and are negative  Objective:    /74 (BP Location: Left arm, Patient Position: Sitting, Cuff Size: Large)   Pulse 85   Temp 98 2 °F (36 8 °C) (Temporal)   Resp 18   Ht 5' 9" (1 753 m)   Wt 113 kg (250 lb)   SpO2 96%   BMI 36 92 kg/m²     Physical Exam  Constitutional:       Appearance: Normal appearance  He is normal weight  HENT:      Head: Normocephalic  Right Ear: Tympanic membrane, ear canal and external ear normal  There is no impacted cerumen  Left Ear: Tympanic membrane, ear canal and external ear normal  There is no impacted cerumen        Nose: Nose normal       Mouth/Throat:      Mouth: Mucous membranes are moist    Eyes:      Extraocular Movements: Extraocular movements intact  Pupils: Pupils are equal, round, and reactive to light  Neck:      Musculoskeletal: Normal range of motion  Cardiovascular:      Rate and Rhythm: Normal rate and regular rhythm  Pulses: Normal pulses  Heart sounds: Normal heart sounds  Comments: POCT EKG normal  Pulmonary:      Effort: Pulmonary effort is normal       Breath sounds: Normal breath sounds  Abdominal:      General: Bowel sounds are normal       Palpations: Abdomen is soft  Musculoskeletal: Normal range of motion  General: No tenderness or signs of injury  Right lower leg: No edema  Left lower leg: No edema  Skin:     General: Skin is warm and dry  Capillary Refill: Capillary refill takes less than 2 seconds  Findings: Rash present  No bruising or lesion  Rash is crusting and papular  Comments: Bilateral clusters of tender, erythematous papules on arms/trunk  Denies genital or oral lesions  Inspection consistent with herpes simplex  Neurological:      General: No focal deficit present  Mental Status: He is alert and oriented to person, place, and time  Psychiatric:         Mood and Affect: Mood normal          Behavior: Behavior normal          Thought Content: Thought content normal          VERONICA Asher  04/30/21  4:48 PM    There are no Patient Instructions on file for this visit

## 2021-12-29 ENCOUNTER — OFFICE VISIT (OUTPATIENT)
Dept: URGENT CARE | Age: 36
End: 2021-12-29
Payer: COMMERCIAL

## 2021-12-29 VITALS — OXYGEN SATURATION: 97 % | TEMPERATURE: 96.7 F | HEART RATE: 78 BPM | RESPIRATION RATE: 16 BRPM

## 2021-12-29 DIAGNOSIS — R05.9 COUGH: Primary | ICD-10-CM

## 2021-12-29 DIAGNOSIS — Z20.822 EXPOSURE TO COVID-19 VIRUS: ICD-10-CM

## 2021-12-29 PROCEDURE — 87636 SARSCOV2 & INF A&B AMP PRB: CPT | Performed by: PHYSICIAN ASSISTANT

## 2021-12-29 PROCEDURE — 99213 OFFICE O/P EST LOW 20 MIN: CPT | Performed by: PHYSICIAN ASSISTANT

## 2022-01-02 LAB
FLUAV RNA RESP QL NAA+PROBE: NEGATIVE
FLUBV RNA RESP QL NAA+PROBE: NEGATIVE
SARS-COV-2 RNA RESP QL NAA+PROBE: POSITIVE

## 2022-01-05 ENCOUNTER — TELEPHONE (OUTPATIENT)
Dept: URGENT CARE | Age: 37
End: 2022-01-05

## 2022-01-05 NOTE — TELEPHONE ENCOUNTER
Called and left voicemail requesting return call  When patient calls back, please discuss test results  Patient has reviewed his results on [facility]  Otolaryngologist Procedure Text (A): After obtaining clear surgical margins the patient was sent to otolaryngology for surgical repair.  The patient understands they will receive post-surgical care and follow-up from the referring physician's office.

## 2022-10-02 ENCOUNTER — HOSPITAL ENCOUNTER (EMERGENCY)
Facility: HOSPITAL | Age: 37
Discharge: HOME | End: 2022-10-03
Attending: EMERGENCY MEDICINE
Payer: COMMERCIAL

## 2022-10-02 ENCOUNTER — APPOINTMENT (EMERGENCY)
Dept: CT IMAGING | Facility: HOSPITAL | Age: 37
End: 2022-10-02
Payer: COMMERCIAL

## 2022-10-02 ENCOUNTER — APPOINTMENT (OUTPATIENT)
Dept: RADIOLOGY | Facility: HOSPITAL | Age: 37
End: 2022-10-02
Payer: COMMERCIAL

## 2022-10-02 VITALS
TEMPERATURE: 99.2 F | SYSTOLIC BLOOD PRESSURE: 137 MMHG | OXYGEN SATURATION: 100 % | RESPIRATION RATE: 16 BRPM | BODY MASS INDEX: 36.43 KG/M2 | HEART RATE: 87 BPM | WEIGHT: 246.69 LBS | DIASTOLIC BLOOD PRESSURE: 65 MMHG

## 2022-10-02 DIAGNOSIS — M79.662 PAIN OF LEFT CALF: Primary | ICD-10-CM

## 2022-10-02 DIAGNOSIS — R93.89 ABNORMAL CT SCAN: ICD-10-CM

## 2022-10-02 DIAGNOSIS — S80.12XA HEMATOMA OF LEFT LOWER LEG: ICD-10-CM

## 2022-10-02 DIAGNOSIS — R79.89 ELEVATED D-DIMER: ICD-10-CM

## 2022-10-02 LAB
ALBUMIN SERPL BCP-MCNC: 3.9 G/DL (ref 3.5–5)
ALP SERPL-CCNC: 105 U/L (ref 46–116)
ALT SERPL W P-5'-P-CCNC: 25 U/L (ref 12–78)
ANION GAP SERPL CALCULATED.3IONS-SCNC: 5 MMOL/L (ref 4–13)
AST SERPL W P-5'-P-CCNC: 15 U/L (ref 5–45)
BASOPHILS # BLD AUTO: 0.06 THOUSANDS/ΜL (ref 0–0.1)
BASOPHILS NFR BLD AUTO: 1 % (ref 0–1)
BILIRUB SERPL-MCNC: 0.8 MG/DL (ref 0.2–1)
BUN SERPL-MCNC: 13 MG/DL (ref 5–25)
CALCIUM SERPL-MCNC: 9.8 MG/DL (ref 8.3–10.1)
CHLORIDE SERPL-SCNC: 105 MMOL/L (ref 96–108)
CK SERPL-CCNC: 96 U/L (ref 39–308)
CO2 SERPL-SCNC: 30 MMOL/L (ref 21–32)
CREAT SERPL-MCNC: 0.85 MG/DL (ref 0.6–1.3)
D DIMER PPP FEU-MCNC: 0.6 UG/ML FEU
EOSINOPHIL # BLD AUTO: 0.23 THOUSAND/ΜL (ref 0–0.61)
EOSINOPHIL NFR BLD AUTO: 3 % (ref 0–6)
ERYTHROCYTE [DISTWIDTH] IN BLOOD BY AUTOMATED COUNT: 13.2 % (ref 11.6–15.1)
GFR SERPL CREATININE-BSD FRML MDRD: 112 ML/MIN/1.73SQ M
GLUCOSE SERPL-MCNC: 85 MG/DL (ref 65–140)
HCT VFR BLD AUTO: 47.6 % (ref 36.5–49.3)
HGB BLD-MCNC: 15.1 G/DL (ref 12–17)
IMM GRANULOCYTES # BLD AUTO: 0.02 THOUSAND/UL (ref 0–0.2)
IMM GRANULOCYTES NFR BLD AUTO: 0 % (ref 0–2)
INR PPP: 0.93 (ref 0.84–1.19)
LYMPHOCYTES # BLD AUTO: 2.07 THOUSANDS/ΜL (ref 0.6–4.47)
LYMPHOCYTES NFR BLD AUTO: 30 % (ref 14–44)
MCH RBC QN AUTO: 27.8 PG (ref 26.8–34.3)
MCHC RBC AUTO-ENTMCNC: 31.7 G/DL (ref 31.4–37.4)
MCV RBC AUTO: 88 FL (ref 82–98)
MONOCYTES # BLD AUTO: 0.55 THOUSAND/ΜL (ref 0.17–1.22)
MONOCYTES NFR BLD AUTO: 8 % (ref 4–12)
NEUTROPHILS # BLD AUTO: 3.95 THOUSANDS/ΜL (ref 1.85–7.62)
NEUTS SEG NFR BLD AUTO: 58 % (ref 43–75)
NRBC BLD AUTO-RTO: 0 /100 WBCS
PLATELET # BLD AUTO: 253 THOUSANDS/UL (ref 149–390)
PMV BLD AUTO: 8.4 FL (ref 8.9–12.7)
POTASSIUM SERPL-SCNC: 4.2 MMOL/L (ref 3.5–5.3)
PROT SERPL-MCNC: 8.1 G/DL (ref 6.4–8.4)
PROTHROMBIN TIME: 12.5 SECONDS (ref 11.6–14.5)
RBC # BLD AUTO: 5.43 MILLION/UL (ref 3.88–5.62)
SODIUM SERPL-SCNC: 140 MMOL/L (ref 135–147)
WBC # BLD AUTO: 6.88 THOUSAND/UL (ref 4.31–10.16)

## 2022-10-02 PROCEDURE — 99284 EMERGENCY DEPT VISIT MOD MDM: CPT

## 2022-10-02 PROCEDURE — 85379 FIBRIN DEGRADATION QUANT: CPT | Performed by: PHYSICIAN ASSISTANT

## 2022-10-02 PROCEDURE — 82550 ASSAY OF CK (CPK): CPT | Performed by: PHYSICIAN ASSISTANT

## 2022-10-02 PROCEDURE — 36415 COLL VENOUS BLD VENIPUNCTURE: CPT | Performed by: PHYSICIAN ASSISTANT

## 2022-10-02 PROCEDURE — 73701 CT LOWER EXTREMITY W/DYE: CPT

## 2022-10-02 PROCEDURE — 99284 EMERGENCY DEPT VISIT MOD MDM: CPT | Performed by: PHYSICIAN ASSISTANT

## 2022-10-02 PROCEDURE — 85610 PROTHROMBIN TIME: CPT | Performed by: PHYSICIAN ASSISTANT

## 2022-10-02 PROCEDURE — 80053 COMPREHEN METABOLIC PANEL: CPT | Performed by: PHYSICIAN ASSISTANT

## 2022-10-02 PROCEDURE — 85025 COMPLETE CBC W/AUTO DIFF WBC: CPT | Performed by: PHYSICIAN ASSISTANT

## 2022-10-02 PROCEDURE — G1004 CDSM NDSC: HCPCS

## 2022-10-02 RX ORDER — ACETAMINOPHEN 325 MG/1
650 TABLET ORAL ONCE
Status: COMPLETED | OUTPATIENT
Start: 2022-10-02 | End: 2022-10-02

## 2022-10-02 RX ADMIN — ACETAMINOPHEN 650 MG: 325 TABLET ORAL at 21:54

## 2022-10-02 NOTE — Clinical Note
Hal Rivas was seen and treated in our emergency department on 10/2/2022  Limited weight-bearing left lower extremity    Diagnosis: Left calf pain, possible tendon rupture    Saul  may return to work on return date  He may return on this date: 10/05/2022         If you have any questions or concerns, please don't hesitate to call        Alvarado Brantley PA-C    ______________________________           _______________          _______________  Hospital Representative                              Date                                Time

## 2022-10-03 ENCOUNTER — HOSPITAL ENCOUNTER (OUTPATIENT)
Dept: NON INVASIVE DIAGNOSTICS | Facility: HOSPITAL | Age: 37
Discharge: HOME | End: 2022-10-03
Payer: COMMERCIAL

## 2022-10-03 DIAGNOSIS — M79.662 PAIN OF LEFT CALF: ICD-10-CM

## 2022-10-03 DIAGNOSIS — R79.89 ELEVATED D-DIMER: ICD-10-CM

## 2022-10-03 PROBLEM — R93.89 ABNORMAL CT SCAN: Status: ACTIVE | Noted: 2022-10-03

## 2022-10-03 PROCEDURE — 93971 EXTREMITY STUDY: CPT

## 2022-10-03 PROCEDURE — 93971 EXTREMITY STUDY: CPT | Performed by: SURGERY

## 2022-10-03 RX ORDER — IBUPROFEN 600 MG/1
600 TABLET ORAL EVERY 6 HOURS PRN
Qty: 30 TABLET | Refills: 0 | Status: SHIPPED | OUTPATIENT
Start: 2022-10-03

## 2022-10-03 RX ORDER — ACETAMINOPHEN 500 MG
1000 TABLET ORAL EVERY 6 HOURS PRN
Qty: 28 TABLET | Refills: 0 | Status: SHIPPED | OUTPATIENT
Start: 2022-10-03 | End: 2022-10-10

## 2022-10-03 RX ADMIN — IOHEXOL 100 ML: 350 INJECTION, SOLUTION INTRAVENOUS at 00:11

## 2022-10-03 NOTE — DISCHARGE INSTRUCTIONS
You were seen in the emergency department today for left calf pain  Laboratory analysis- revealed elevated D-dimer    Radiologic imaging revealed:  3 0 x 0 8 x 4 4 cm collection in the medial head of the gastric anemias and soleus muscles  Differential includes hematoma or infection, hematoma may be secondary to plantar tendon injury/rupture    Please follow-up with your primary care physician as well as orthopedic regarding your symptoms  Please return to the emergency department with any worsening symptoms including but not limited to: fevers, worsening pain, chest pain, shortness of breath, palpitations, worsening redness, decreased range of motion the foot/knee, numbness, weakness, tingling, or any other concerning symptoms  Crutches as needed for pain  Close follow-up with orthopedics  Go to ultrasound tomorrow for duplex of the left lower extremity- script attached    Tylenol Motrin at home for pain  Rest, heat, elevation

## 2022-10-03 NOTE — ED PROVIDER NOTES
History  Chief Complaint   Patient presents with    Foot Pain     Pain and swelling to left foot, Sunday gotten progressively worse     Mukul Mcclendon is a 40 y o   male with no documented past medical history who presents to the emergency department with left calf pain  Patient states for last week he has had increasing pain and swelling in his left calf with swelling into the rest of the leg/calf  Patient states he 1st noted the pain approximately 1 week ago a playing softball  Patient states he was running and then had to stop running due to pain in his superior calf  There was no direct trauma to the extremity,  no twisting of the knee/ankle  Patient states the swelling has progressively increased pain has focused more to the left calf  The patient denies any smoking, recent long-term travel, sedentary lifestyle, active cancer, family history of blood clots, personal history of blood clots, clotting disorder  Denies any decreased f range of motion, denies any numbness, weakness, paresthesias  The patient denies any fevers, chills, dizziness, headaches, chest pain, shortness of breath, palpitations, abdominal pain, nausea, vomiting, diarrhea, lower extremity pain/swelling/edema  History provided by:  Patient   used: No    Leg Pain  Location:  Leg  Time since incident:  1 week  Lower extremity injury: Possible      Leg location:  L leg  Pain details:     Quality:  Sharp    Radiates to:  Does not radiate    Severity:  Moderate    Onset quality:  Gradual    Duration:  1 week    Timing:  Constant    Progression:  Worsening  Chronicity:  New  Dislocation: no    Foreign body present:  No foreign bodies  Tetanus status:  Unknown  Prior injury to area:  No  Relieved by:  Nothing  Worsened by:  Bearing weight and activity  Ineffective treatments:  None tried  Associated symptoms: no back pain, no decreased ROM, no fatigue, no fever, no itching, no muscle weakness, no neck pain, no numbness, no stiffness, no swelling and no tingling    Risk factors: no concern for non-accidental trauma, no frequent fractures, no known bone disorder, no obesity and no recent illness        Prior to Admission Medications   Prescriptions Last Dose Informant Patient Reported? Taking?   baclofen 10 mg tablet   No No   Sig: Take 1 tablet (10 mg total) by mouth 3 (three) times a day as needed for muscle spasms   Patient not taking: Reported on 3/26/2021   naproxen (NAPROSYN) 500 mg tablet   No No   Sig: Take 1 tablet (500 mg total) by mouth 2 (two) times a day with meals   Patient not taking: Reported on 3/26/2021   pantoprazole (PROTONIX) 20 mg tablet   No No   Sig: Take 1 tablet (20 mg total) by mouth daily   Patient not taking: Reported on 12/29/2021    valACYclovir (VALTREX) 1,000 mg tablet   No No   Sig: Take 1 tablet (1,000 mg total) by mouth 2 (two) times a day      Facility-Administered Medications: None       History reviewed  No pertinent past medical history  Past Surgical History:   Procedure Laterality Date    NO PAST SURGERIES         Family History   Problem Relation Age of Onset    Asthma Mother     Diabetes Father     Diabetes Cousin      I have reviewed and agree with the history as documented  E-Cigarette/Vaping     E-Cigarette/Vaping Substances     Social History     Tobacco Use    Smoking status: Never Smoker    Smokeless tobacco: Never Used   Substance Use Topics    Alcohol use: No    Drug use: No       Review of Systems   Constitutional: Negative for activity change, appetite change, chills, fatigue, fever and unexpected weight change  HENT: Negative for congestion, ear discharge, postnasal drip, rhinorrhea, sinus pressure, sinus pain and sore throat  Respiratory: Negative for apnea, cough, choking, chest tightness, shortness of breath, wheezing and stridor  Cardiovascular: Negative for chest pain, palpitations and leg swelling     Gastrointestinal: Negative for abdominal pain, blood in stool, constipation, diarrhea, nausea and vomiting  Genitourinary: Negative for dysuria, flank pain, frequency and urgency  Musculoskeletal: Positive for myalgias  Negative for arthralgias, back pain, neck pain, neck stiffness and stiffness  Skin: Negative for color change, itching, pallor, rash and wound  Neurological: Negative for dizziness, tremors, seizures, syncope, light-headedness, numbness and headaches  All other systems reviewed and are negative  Physical Exam  Physical Exam  Vitals and nursing note reviewed  Constitutional:       General: He is not in acute distress  Appearance: Normal appearance  He is normal weight  He is not ill-appearing or toxic-appearing  HENT:      Head: Normocephalic and atraumatic  Nose: Nose normal       Mouth/Throat:      Mouth: Mucous membranes are moist       Pharynx: Oropharynx is clear  No oropharyngeal exudate  Eyes:      Pupils: Pupils are equal, round, and reactive to light  Cardiovascular:      Rate and Rhythm: Normal rate and regular rhythm  Pulses: Normal pulses  Heart sounds: Normal heart sounds  No murmur heard  No friction rub  No gallop  Pulmonary:      Effort: Pulmonary effort is normal  No respiratory distress  Breath sounds: Normal breath sounds  No stridor  No wheezing, rhonchi or rales  Abdominal:      General: Abdomen is flat  Bowel sounds are normal  There is no distension  Palpations: Abdomen is soft  There is no mass  Tenderness: There is no abdominal tenderness  There is no guarding or rebound  Hernia: No hernia is present  Musculoskeletal:         General: No swelling, tenderness or deformity  Normal range of motion  Cervical back: Normal range of motion  No rigidity or tenderness  Right lower leg: No edema  Left lower leg: Edema present          Legs:       Comments: Left lower extremity:   2+ femoral/DP/PT/ Cap Refill <2 seconds  Hip: NTTP, Strength 5/5- FROM including Abduction/Adduction/Flexion/Extension  Knee: NTTP, Strength 5/5- FROM including Flexion/Extension- no apparent laxity  Tenderness palpation over calf  Swelling noted  Ankle: NTTP, Strength 5/5- FROM including Flexion/Extension/Inversion/Eversion  Foot : NTTP: Strength 5/5- FROM at all toes including flexion, extension, abduction, adduction  Sensation intact over all joints and foot  Skin:     General: Skin is warm and dry  Capillary Refill: Capillary refill takes less than 2 seconds  Neurological:      General: No focal deficit present  Mental Status: He is alert and oriented to person, place, and time  Mental status is at baseline  Cranial Nerves: No cranial nerve deficit  Sensory: No sensory deficit  Motor: No weakness           Vital Signs  ED Triage Vitals [10/02/22 2045]   Temperature Pulse Respirations Blood Pressure SpO2   99 2 °F (37 3 °C) 87 16 137/65 100 %      Temp Source Heart Rate Source Patient Position - Orthostatic VS BP Location FiO2 (%)   Oral Monitor Sitting Right arm --      Pain Score       7           Vitals:    10/02/22 2045   BP: 137/65   Pulse: 87   Patient Position - Orthostatic VS: Sitting         Visual Acuity      ED Medications  Medications   acetaminophen (TYLENOL) tablet 650 mg (650 mg Oral Given 10/2/22 2154)   iohexol (OMNIPAQUE) 350 MG/ML injection (SINGLE-DOSE) 100 mL (100 mL Intravenous Given 10/3/22 0011)       Diagnostic Studies  Results Reviewed     Procedure Component Value Units Date/Time    D-dimer, quantitative [[id number]]  (Abnormal) Collected: 10/02/22 2154    Lab Status: Final result Specimen: Blood from Arm, Left Updated: 10/02/22 2222     D-Dimer, Quant 0 60 ug/ml [facility]     Comprehensive metabolic panel [[id number]] Collected: 10/02/22 2154    Lab Status: Final result Specimen: Blood from Arm, Left Updated: 10/02/22 2219     Sodium 140 mmol/L      Potassium 4 2 mmol/L      Chloride 105 mmol/L      CO2 30 mmol/L ANION GAP 5 mmol/L      BUN 13 mg/dL      Creatinine 0 85 mg/dL      Glucose 85 mg/dL      Calcium 9 8 mg/dL      AST 15 U/L      ALT 25 U/L      Alkaline Phosphatase 105 U/L      Total Protein 8 1 g/dL      Albumin 3 9 g/dL      Total Bilirubin 0 80 mg/dL      eGFR 112 ml/min/1 73sq m     Narrative:      [place] guidelines for Chronic Kidney Disease (CKD):     Stage 1 with normal or high GFR (GFR > 90 mL/min/1 73 square meters)    Stage 2 Mild CKD (GFR = 60-89 mL/min/1 73 square meters)    Stage 3A Moderate CKD (GFR = 45-59 mL/min/1 73 square meters)    Stage 3B Moderate CKD (GFR = 30-44 mL/min/1 73 square meters)    Stage 4 Severe CKD (GFR = 15-29 mL/min/1 73 square meters)    Stage 5 End Stage CKD (GFR <15 mL/min/1 73 square meters)  Note: GFR calculation is accurate only with a steady state creatinine    CK (with reflex to MB) [[id number]]  (Normal) Collected: 10/02/22 2158    Lab Status: Final result Specimen: Blood from Arm, Left Updated: 10/02/22 2218     Total CK 96 U/L     Protime-INR [[id number]]  (Normal) Collected: 10/02/22 2154    Lab Status: Final result Specimen: Blood from Arm, Left Updated: 10/02/22 2217     Protime 12 5 seconds      INR 0 93    CBC and differential [[id number]]  (Abnormal) Collected: 10/02/22 2154    Lab Status: Final result Specimen: Blood from Arm, Left Updated: 10/02/22 2204     WBC 6 88 Thousand/uL      RBC 5 43 Million/uL      Hemoglobin 15 1 g/dL      Hematocrit 47 6 %      MCV 88 fL      MCH 27 8 pg      MCHC 31 7 g/dL      RDW 13 2 %      MPV 8 4 fL      Platelets 174 Thousands/uL      nRBC 0 /100 WBCs      Neutrophils Relative 58 %      Immat GRANS % 0 %      Lymphocytes Relative 30 %      Monocytes Relative 8 %      Eosinophils Relative 3 %      Basophils Relative 1 %      Neutrophils Absolute 3 95 Thousands/µL      Immature Grans Absolute 0 02 Thousand/uL      Lymphocytes Absolute 2 07 Thousands/µL      Monocytes Absolute 0 55 Thousand/µL Eosinophils Absolute 0 23 Thousand/µL      Basophils Absolute 0 06 Thousands/µL                  CT lower extremity w contrast left   Final Result by Jamari Franks MD (10/03 0043)         3 0 x 0 8 x 4 4 cm collection in the medial head of the gastric anemias and soleus muscles  Differential includes hematoma or infection, hematoma may be secondary to plantar tendon injury/rupture         No active extravasation of contrast       No fracture or dislocation  Workstation performed: [id number]         VAS lower limb venous duplex study, unilateral/limited    (Results Pending)              Procedures  Procedures         ED Course                               SBIRT 20yo+    Flowsheet Row Most Recent Value   SBIRT (25 yo +)    In order to provide better care to our patients, we are screening all of our patients for alcohol and drug use  Would it be okay to ask you these screening questions?  No Filed at: 10/02/2022 2200            Wells' Criteria for DVT    Flowsheet Row Most Recent Value   Ramez' Criteria for DVT    Active cancer Treatment or palliation within 6 months 0 Filed at: 10/02/2022 2159   Bedridden recently >3 days or major surgery within 12 weeks 0 Filed at: 10/02/2022 2159   Calf swelling >3 cm compared to the other leg 1 Filed at: 10/02/2022 2159   Entire leg swollen 1 Filed at: 10/02/2022 2159   Collateral (nonvaricose) superficial veins present 1 Filed at: 10/02/2022 2159   Localized tenderness along the deep venous system 1 Filed at: 10/02/2022 2159   Pitting edema, confined to symptomatic leg 1 Filed at: 10/02/2022 2159   Paralysis, paresis, or recent plaster immobilization of the lower extremity 0 Filed at: 10/02/2022 2159   Previously documented DVT 0 Filed at: 10/02/2022 2159   Alternative diagnosis to DVT as likely or more likely 0 Filed at: 10/02/2022 2159   Ramez DVT Critera Score 5 Filed at: 10/02/2022 2159              MDM  Number of Diagnoses or Management Options  Abnormal CT scan: new and requires workup  Elevated d-dimer: new and requires workup  Hematoma of left lower leg: new and requires workup  Pain of left calf: new and requires workup  Diagnosis management comments: Patient was seen and examined  in the emergency department for chief complaint of left lower extremity swelling and tenderness over the calf  The patient presented with swelling and tenderness to the posterior calf to the left leg  Started about a week ago during a softball game  No direct trauma  No inversion injury of the ankle  No pain in the knee or the ankle  2+ DP/PT pulses  Sensations intact  Pitting edema distally  Collateral veins prominent         DDx including but not limited to: sprain, strain, tendon rupture, rhabdomyolysis, myositis, fracture, contusion, metabolic abnormality, radiculopathy, DVT,  doubt arterial occlusion  Workup: Will check labs including CK and D-dimer  Treat symptomatically  At this point based on physical have suspicion for DVT with high Wells criteria  The setting of possible trauma will obtain CT lower extremity to rule out bleed prior to anticoagulating  Elevated D-dimer, remainder of labs reassuring  CT reveals:3 0 x 0 8 x 4 4 cm collection in the medial head of the gastric anemias and soleus muscles  Differential includes hematoma or infection, hematoma may be secondary to plantar tendon injury/rupture       Based on history- plantar tendon injury with resulting hematoma correlates  At this point with hematoma present feel that anticoagulating the patient will  place patient higher harm-   I discussed this with the patient and he is in agreement- discussed risks and benefits of anticoagulation he would like to defer until duplex results     Will still send for duplex  Vascular lab called  Order placed  Script given  Do not suspect infectious etiology     Will give crutches for likely plantar tendon rupture- follow-up with orthopedics  Pain control  Rest, ice, elevation  Disposition:  General impression 42-year-old male with pain left calf found have hematoma  Likely pressure plantar 10 with resulting hematoma  Follow-up orthopedics  Proceed with outpatient duplex  Deferred anticoagulation at patient's request   Return precautions and PCP follow-up discussed  The patient was discharged in stable condition  Patient ambulated off the department  Extensive return to emergency department precautions were discussed  Follow up with appropriate providers including primary care physician was discussed  Patient and/or their  primary decision maker expressed understanding  Patient remained stable during entire emergency department stay  Amount and/or Complexity of Data Reviewed  Clinical lab tests: ordered and reviewed  Tests in the radiology section of CPT®: ordered and reviewed  Review and summarize past medical records: yes  Independent visualization of images, tracings, or specimens: yes    Risk of Complications, Morbidity, and/or Mortality  Presenting problems: moderate  Diagnostic procedures: moderate  Management options: moderate    Patient Progress  Patient progress: stable      Disposition  Final diagnoses:   Pain of left calf   Hematoma of left lower leg   Elevated d-dimer   Abnormal CT scan - 3 0 x 0 8 x 4 4 cm collection in the medial head of the gastric anemias and soleus muscles  Differential includes hematoma or infection, hematoma may be secondary to plantar tendon injury/rupture        Time reflects when diagnosis was documented in both MDM as applicable and the Disposition within this note     Time User Action Codes Description Comment    10/3/2022 12:57 AM Francella Ruck Add [M79 662] Pain of left calf     10/3/2022 12:58 AM Francella Ruck Add [S80 12XA] Hematoma of left lower leg     10/3/2022 12:58 AM Francella Ruck Add [R79 89] Elevated d-dimer     10/3/2022 12:59 AM Aleahlla Ruck Add [R93 89] Abnormal CT scan 10/3/2022  1:00 AM Zacarias Jesus Modify [R93 89] Abnormal CT scan 3 0 x 0 8 x 4 4 cm collection in the medial head of the gastric anemias and soleus muscles  Differential includes hematoma or infection, hematoma may be secondary to plantar tendon injury/rupture         ED Disposition     ED Disposition   Discharge    Condition   Stable    Date/Time   Mon Oct 3, 2022  1:00 AM    Comment   Saul Ruff discharge to home/self care  Follow-up Information     Follow up With Specialties Details Why Contact Info Additional [address] [facility] Emergency Department Emergency Medicine Go to  As needed, If symptoms worsen [facility] [address]  [address] [facility] [place], [facility] Emergency Department, [address] [facility] , [place], [place], [facility] 200  Call  To schedule an appointment with a primary care physician [phone]       Chika Case, [address] [place] [address], Nurse Practitioner Schedule an appointment as soon as possible for a visit   [address] [facility] [address]        [address] Orthopedic Surgery Go to  As needed, If symptoms worsen [address] [place] [address] [address]  [address] [facility], [address] [place], [place], [address]   [address] [facility] Ultrasound Radiology Call   [facility] [address] [phone] [address] [facility] Ultrasound, [address] [facility] , [place], [place], [address] [facility]     Your appointment is located at [facility] , [address] [facility], [facility], [address]  Please park in the parking deck located behind the hospital  From [address] turn at [address] toward the hospital and make the next right onto [address]   The entrance for the parking deck will be straight ahead  Once inside the parking deck proceed to the elevators and take the elevators to the 1st floor where you will be greeted by a  who will help you find your location  Appointments after 8PM will require you to park in the Emergency Department parking lot which is located at [address] [place] [address]  Please enter the Emergency Department entrance and a  will help you find your location  Discharge Medication List as of 10/3/2022  1:06 AM      START taking these medications    Details   acetaminophen (TYLENOL) 500 mg tablet Take 2 tablets (1,000 mg total) by mouth every 6 (six) hours as needed for moderate pain for up to 7 days, Starting Mon 10/3/2022, Until Mon 10/10/2022 at 2359, Normal      ibuprofen (MOTRIN) 600 mg tablet Take 1 tablet (600 mg total) by mouth every 6 (six) hours as needed for mild pain or moderate pain, Starting Mon 10/3/2022, Normal         CONTINUE these medications which have NOT CHANGED    Details   baclofen 10 mg tablet Take 1 tablet (10 mg total) by mouth 3 (three) times a day as needed for muscle spasms, Starting Fri 4/20/2018, Normal      naproxen (NAPROSYN) 500 mg tablet Take 1 tablet (500 mg total) by mouth 2 (two) times a day with meals, Starting Fri 4/20/2018, Normal      pantoprazole (PROTONIX) 20 mg tablet Take 1 tablet (20 mg total) by mouth daily, Starting Fri 3/26/2021, Normal      valACYclovir (VALTREX) 1,000 mg tablet Take 1 tablet (1,000 mg total) by mouth 2 (two) times a day, Starting Fri 4/30/2021, Until Sun 5/30/2021, Normal             Outpatient Discharge Orders   Ambulatory Referral to Orthopedic Surgery   Standing Status: Future Standing Exp  Date: 10/03/23      VAS lower limb venous duplex study, unilateral/limited   Standing Status: Future Standing Exp   Date: 10/03/26       PDMP Review     None          ED Provider  Electronically Signed by           Jose David Alexis PA-C  10/03/22 3431

## 2022-11-03 VITALS
WEIGHT: 242 LBS | SYSTOLIC BLOOD PRESSURE: 138 MMHG | HEIGHT: 69 IN | HEART RATE: 91 BPM | BODY MASS INDEX: 35.84 KG/M2 | DIASTOLIC BLOOD PRESSURE: 84 MMHG

## 2022-11-03 DIAGNOSIS — S80.12XA HEMATOMA OF LEFT LOWER LEG: ICD-10-CM

## 2022-11-03 DIAGNOSIS — S86.112A STRAIN OF GASTROCNEMIUS MUSCLE OF LEFT LOWER EXTREMITY, INITIAL ENCOUNTER: Primary | ICD-10-CM

## 2022-11-03 DIAGNOSIS — M79.662 PAIN OF LEFT CALF: ICD-10-CM

## 2022-11-03 DIAGNOSIS — R93.89 ABNORMAL CT SCAN: ICD-10-CM

## 2022-11-03 NOTE — PROGRESS NOTES
Assessment:   Diagnosis ICD-10-CM Associated Orders   1  Strain of gastrocnemius muscle of left lower extremity, initial encounter  S86 112A MRI tibia fibula left w wo contrast   2  Pain of left calf  M79 [id number] Ambulatory Referral to Orthopedic Surgery     MRI tibia fibula left w wo contrast   3  Hematoma of left lower leg  S80  12XA Ambulatory Referral to Orthopedic Surgery     MRI tibia fibula left w wo contrast   4  Abnormal CT scan  R93 [id number] Ambulatory Referral to Orthopedic Surgery     MRI tibia fibula left w wo contrast    3 0 x 0 8 x 4 4 cm collection in the medial head of the gastric anemias and soleus muscles  Differential includes hematoma or infection, hematoma may be secondary to plantar tendon injury/rupture          Plan:  Reviewed today's physical exam findings and x-ray findings with patient at time of visit  MRI ordered to rule out torn medial gastrocnemius head w/ mass, he was noted to have a mass previously on CT scan  He will be seen for follow-up after MRI is complete for re-evaluation  Patient expresses understanding and is in agreement with this treatment plan  The patient was given the opportunity to ask questions or present concerns  To do next visit:  Return for Recheck after MRI is complete  The above stated was discussed in layman's terms and the patient expressed understanding  All questions were answered to the patient's satisfaction  Scribe Attestation    I,:  Laura Kunz am acting as a scribe while in the presence of the attending physician :       I,:  Fareed Owusu DO personally performed the services described in this documentation    as scribed in my presence :             Subjective:   Abiola Durand is a 40 y o  male who presents today for an Initial evaluation of his left lower leg  Patient was last seen on 10/02/2022 in the ED for worsening pain and swelling in his left calf  He was instructed to follow up with orthopedic surgery   On today's presentation, the patient states that he first noticed the pain when he was running in softball  The patient denies any gurmeet trauma or ABHISHEK  The patient states that he took time off from softball  Upon returning to physical activity a few weeks later, the patient states he experienced worsened symptoms  The patient states that his pain is along the proximal medial gastrocnemius head  The patient denied any smoking, recent long-term travel, sedentary lifestyle, active cancer, family history of blood clots, personal history of blood clots, clotting disorder  He denies any recent bruising, numbness, paresthesias, weakness, or feelings of instability  The patient denies any fevers, chills, dizziness, headaches, chest pain, shortness of breath, palpitations, abdominal pain, nausea, vomiting, diarrhea, lower extremity pain/swelling/edema  Review of systems negative unless otherwise specified in HPI  Review of Systems   Constitutional: Negative for chills and fever  HENT: Negative for ear pain and sore throat  Eyes: Negative for pain and visual disturbance  Respiratory: Negative for cough and shortness of breath  Cardiovascular: Negative for chest pain and palpitations  Gastrointestinal: Negative for abdominal pain and vomiting  Genitourinary: Negative for dysuria and hematuria  Musculoskeletal: Negative for arthralgias and back pain  Skin: Negative for color change and rash  Neurological: Negative for seizures and syncope  All other systems reviewed and are negative  History reviewed  No pertinent past medical history      Past Surgical History:   Procedure Laterality Date   • NO PAST SURGERIES         Family History   Problem Relation Age of Onset   • Asthma Mother    • Diabetes Father    • Diabetes Cousin        Social History     Occupational History   • Not on file   Tobacco Use   • Smoking status: Never Smoker   • Smokeless tobacco: Never Used   Substance and Sexual Activity   • Alcohol use: No   • Drug use: No   • Sexual activity: Not on file         Current Outpatient Medications:   •  baclofen 10 mg tablet, Take 1 tablet (10 mg total) by mouth 3 (three) times a day as needed for muscle spasms (Patient not taking: Reported on 3/26/2021), Disp: 30 tablet, Rfl: 0  •  ibuprofen (MOTRIN) 600 mg tablet, Take 1 tablet (600 mg total) by mouth every 6 (six) hours as needed for mild pain or moderate pain, Disp: 30 tablet, Rfl: 0  •  naproxen (NAPROSYN) 500 mg tablet, Take 1 tablet (500 mg total) by mouth 2 (two) times a day with meals (Patient not taking: Reported on 3/26/2021), Disp: 60 tablet, Rfl: 0  •  pantoprazole (PROTONIX) 20 mg tablet, Take 1 tablet (20 mg total) by mouth daily (Patient not taking: Reported on 12/29/2021 ), Disp: 90 tablet, Rfl: 1  •  valACYclovir (VALTREX) 1,000 mg tablet, Take 1 tablet (1,000 mg total) by mouth 2 (two) times a day, Disp: 60 tablet, Rfl: 1    No Known Allergies       Vitals:    11/03/22 1539   BP: 138/84   Pulse: 91       Objective:                    Ortho Exam  left lower extremity -   Patient ambulates with antalgic gait pattern  Uses No assistive device  No anatomical deformity  Skin is warm and dry to touch with no signs of erythema, ecchymosis, infection  No soft tissue swelling or effusion noted  ROM normal  TTP over proximal medial gastrocnemius with possible mass palpable  - Ibrahim  Calf compartments are soft and supple  2+ TP and DP pulses with brisk capillary refill to the toes  Sural, saphenous, tibial, superficial, and deep peroneal motor and sensory distributions intact  Sensation to light touch intact distally    Diagnostics, reviewed and taken today if performed as documented:    None performed today but reviewed    The attending physician has personally reviewed the pertinent films in PACS and interpretation is as follows:    CT Scan of left lower extremity with contrast taken on 10/03/2022 were reviewed and showed 3 0 x 0 8 x 4 4 cm collection in the medial head of the gastric anemias and soleus muscles  Differential includes hematoma or infection, hematoma may be secondary to plantar tendon injury/rupture  No active extravasation of contrast  No fracture or dislocation  VAS of lower limb venous duplex study, unilateral / limited taken on 10/03/2022 were reviewed and showed    RIGHT LOWER LIMB LIMITED:  Evaluation shows no evidence of thrombus in the common femoral vein  Doppler evaluation shows a normal response to augmentation maneuvers  LEFT LOWER LIMB: No evidence of acute or chronic deep vein thrombosis  No evidence of superficial thrombophlebitis noted  Doppler evaluation shows a normal response to augmentation maneuvers  Popliteal, posterior tibial and anterior tibial arterial Doppler waveforms are triphasic  Procedures, if performed today:    None performed    Portions of the record may have been created with voice recognition software  Occasional wrong word or "sound a like" substitutions may have occurred due to the inherent limitations of voice recognition software  Read the chart carefully and recognize, using context, where substitutions have occurred

## 2022-11-17 ENCOUNTER — HOSPITAL ENCOUNTER (OUTPATIENT)
Dept: MRI IMAGING | Facility: HOSPITAL | Age: 37
End: 2022-11-17
Attending: STUDENT IN AN ORGANIZED HEALTH CARE EDUCATION/TRAINING PROGRAM

## 2022-11-17 DIAGNOSIS — M79.662 PAIN OF LEFT CALF: ICD-10-CM

## 2022-11-17 DIAGNOSIS — S86.112A STRAIN OF GASTROCNEMIUS MUSCLE OF LEFT LOWER EXTREMITY, INITIAL ENCOUNTER: ICD-10-CM

## 2022-11-17 DIAGNOSIS — R93.89 ABNORMAL CT SCAN: ICD-10-CM

## 2022-11-17 DIAGNOSIS — S80.12XA HEMATOMA OF LEFT LOWER LEG: ICD-10-CM

## 2022-11-17 RX ADMIN — GADOBUTROL 11 ML: 604.72 INJECTION INTRAVENOUS at 12:17

## 2022-12-01 ENCOUNTER — OFFICE VISIT (OUTPATIENT)
Dept: OBGYN CLINIC | Facility: MEDICAL CENTER | Age: 37
End: 2022-12-01

## 2022-12-01 VITALS
BODY MASS INDEX: 35.55 KG/M2 | HEIGHT: 69 IN | SYSTOLIC BLOOD PRESSURE: 133 MMHG | HEART RATE: 78 BPM | DIASTOLIC BLOOD PRESSURE: 74 MMHG | WEIGHT: 240 LBS

## 2022-12-01 DIAGNOSIS — M79.662 PAIN OF LEFT CALF: ICD-10-CM

## 2022-12-01 DIAGNOSIS — S80.12XA HEMATOMA OF LEFT LOWER LEG: ICD-10-CM

## 2022-12-01 DIAGNOSIS — S86.112A STRAIN OF GASTROCNEMIUS MUSCLE OF LEFT LOWER EXTREMITY, INITIAL ENCOUNTER: Primary | ICD-10-CM

## 2022-12-01 NOTE — PROGRESS NOTES
Orthopedic Medicine Established Patient Visit     Assesment:   40 y o  male with left medial head of gastrocnemius strain and hematoma    Plan:    The patients MRI study was reviewed today  I have discussed it to prescribe physical therapy in an effort to decrease pain, improve strength, and improve flexibility  He was told he may begin doing cardio independently  I will see the patient back in approximately 3 months for re-evaluation  Follow up:    Return in about 3 months (around 3/1/2023) for Recheck  Chief Complaint   Patient presents with   • Left Lower Leg - Follow-up       History of Present Illness: The patient is a 40 y o  male with strain of gastrocnemius of left lower extremity  The patient was last seen in the office on 11/03/2022 where an MRI study was ordered to rule out a tear of the medial gastrocnemius head with mass has he had a notable mass on CT scan  The patient presents to the office today for his MRI results  He reports he is sore and feels like there is tension  He has no difficulty walking  His wife is present who helped translate portions of the history  Surgical History:  None    Past Medical, Social and Family History:  History reviewed  No pertinent past medical history    Past Surgical History:   Procedure Laterality Date   • NO PAST SURGERIES       No Known Allergies  Current Outpatient Medications on File Prior to Visit   Medication Sig Dispense Refill   • ibuprofen (MOTRIN) 600 mg tablet Take 1 tablet (600 mg total) by mouth every 6 (six) hours as needed for mild pain or moderate pain 30 tablet 0   • baclofen 10 mg tablet Take 1 tablet (10 mg total) by mouth 3 (three) times a day as needed for muscle spasms (Patient not taking: Reported on 3/26/2021) 30 tablet 0   • naproxen (NAPROSYN) 500 mg tablet Take 1 tablet (500 mg total) by mouth 2 (two) times a day with meals (Patient not taking: Reported on 3/26/2021) 60 tablet 0   • pantoprazole (PROTONIX) 20 mg tablet Take 1 tablet (20 mg total) by mouth daily (Patient not taking: Reported on 12/29/2021) 90 tablet 1   • valACYclovir (VALTREX) 1,000 mg tablet Take 1 tablet (1,000 mg total) by mouth 2 (two) times a day 60 tablet 1     No current facility-administered medications on file prior to visit  Social History     Socioeconomic History   • Marital status: Unknown     Spouse name: Not on file   • Number of children: Not on file   • Years of education: Not on file   • Highest education level: Not on file   Occupational History   • Not on file   Tobacco Use   • Smoking status: Never   • Smokeless tobacco: Never   Substance and Sexual Activity   • Alcohol use: No   • Drug use: No   • Sexual activity: Not on file   Other Topics Concern   • Not on file   Social History Narrative   • Not on file     Social Determinants of Health     Financial Resource Strain: Not on file   Food Insecurity: Not on file   Transportation Needs: Not on file   Physical Activity: Not on file   Stress: Not on file   Social Connections: Not on file   Intimate Partner Violence: Not on file   Housing Stability: Not on file         I have reviewed the past medical, surgical, social and family history, medications and allergies as documented in the EMR  Review of systems: ROS is negative other than that noted in the HPI  Constitutional: Negative for fatigue and fever  HENT: Negative for sore throat  Respiratory: Negative for shortness of breath  Cardiovascular: Negative for chest pain  Gastrointestinal: Negative for abdominal pain  Endocrine: Negative for cold intolerance and heat intolerance  Genitourinary: Negative for flank pain  Musculoskeletal: Negative for back pain  Skin: Negative for rash  Allergic/Immunologic: Negative for immunocompromised state  Neurological: Negative for dizziness  Psychiatric/Behavioral: Negative for agitation        Physical Exam:    Blood pressure 133/74, pulse 78, height 5' 9" (1 753 m), weight 109 kg (240 lb)  General/Constitutional: NAD, well developed, well nourished  HENT: Normocephalic, atraumatic  CV: Intact distal pulses, regular rate  Resp: No respiratory distress or labored breathing  Lymphatic: No lymphadenopathy palpated  Neuro: Alert and Oriented x 3, no focal deficits  Psych: Normal mood, normal affect  Skin: Warm, dry, no rashes, no erythema      Left Lower Leg:        Imaging    MRI of the left tibia and fibula reviewed and interpreted today  These show a strain of the medial head of the gastrocnemius with adjacent heterogeneous hematoma      Scribe Attestation    I,:  Stevo Vann am acting as a scribe while in the presence of the attending physician :       I,:  Talmage Schlatter, DO personally performed the services described in this documentation    as scribed in my presence :